# Patient Record
Sex: MALE | Race: WHITE
[De-identification: names, ages, dates, MRNs, and addresses within clinical notes are randomized per-mention and may not be internally consistent; named-entity substitution may affect disease eponyms.]

---

## 2017-07-03 NOTE — CT PRELIMINARY REPORT
Accession: [id number]

Exam: CT Thoracic Spine W/O

 

IMPRESSION: 

1. Acute T2 and T3 compression fractures involving the superior endplate anterior columns with mild c
ompression. Acute left posterior medial first rib fracture, mildly displaced. Acute left posterior me
dial second and third rib fractures extending into the costovertebral joints. Small left apex hemotho
rax associated with the first rib fracture. 

2. Couple of tiny right upper lobe pulmonary nodules measuring 4 mm. Recommend a one-year follow-up c
hest CT to confirm stability. Otherwise, as above.

 

[facility]

 

SITE ID: [id number]

## 2017-07-03 NOTE — ED PHYSICIAN DOCUMENTATION
PD HPI HEAD INJURY





- Stated complaint


Stated Complaint: HEAD INJ





- Chief complaint


Chief Complaint: Laceration





- History obtained from


History obtained from: Patient





- History of Present Illness


Mechanism of head injury: Fell (He was playing tennis today, his hamstring 

locked up on him which has been an ongoing issue, and he fell hitting his 

occiput on a chain link fence with a large laceration there and complains of 

neck and upper back pain.  No other injuries.  Tetanus is up-to-date.  No loss 

of consciousness.  No anticoagulants.)





Review of Systems


Ten Systems: 10 systems reviewed and negative


Constitutional: denies: Fever, Chills


Cardiac: denies: Chest pain / pressure, Palpitations


Respiratory: denies: Dyspnea, Cough


GI: denies: Abdominal Pain





PD PAST MEDICAL HISTORY





- Present Medications


Home Medications: 


 Ambulatory Orders











 Medication  Instructions  Recorded  Confirmed


 


Simvastatin 10 mg PO DAILY 07/03/17 07/03/17


 


raNITIdine [Zantac] 150 mg PO DAILY 07/03/17 07/03/17


 


traZODone [Desyrel] 150 mg PO DAILY 07/03/17 07/03/17














- Allergies


Allergies/Adverse Reactions: 


 Allergies











Allergy/AdvReac Type Severity Reaction Status Date / Time


 


No Known Drug Allergies Allergy   Verified 07/03/17 17:44














PD ED PE NORMAL





- Vitals


Vital signs reviewed: Yes





- General


General: Alert and oriented X 3, No acute distress





- HEENT


HEENT: PERRL, EOMI, Other (Large occipital laceration, incompletely evaluated 

on initial evaluation because of C-spine precautions)





- Neck


Neck: No bony TTP





- Cardiac


Cardiac: RRR, No murmur





- Respiratory


Respiratory: No respiratory distress, Clear bilaterally





- Abdomen


Abdomen: Soft, Non tender





- Back


Back: No CVA TTP, Other (Some upper T-spine tenderness)





- Derm


Derm: Normal color, Warm and dry





- Extremities


Extremities: Other (Tenderness of the left hamstring but good range of motion, 

NVI)





- Neuro


Neuro: Alert and oriented X 3, Normal speech





- Psych


Psych: Normal mood, Normal affect





Results





- Vitals


Vitals: 


 Vital Signs - 24 hr











  07/03/17 07/03/17





  17:37 20:23


 


Temperature 36.6 C 


 


Heart Rate 64 67


 


Respiratory 20 18





Rate  


 


Blood Pressure 154/81 H 126/73


 


O2 Saturation 98 96








 Oxygen











O2 Source                      Room air

















- Labs


Labs: 


 Laboratory Tests











  07/03/17 07/03/17 07/03/17





  20:20 20:20 20:20


 


WBC  13.8 H  


 


RBC  4.73  


 


Hgb  13.7 L  


 


Hct  40.4 L  


 


MCV  85.4  


 


MCH  29.0  


 


MCHC  34.0  


 


RDW  13.2  


 


Plt Count  177  


 


MPV  8.5  


 


Neut #  11.1 H  


 


Lymph #  1.3 L  


 


Mono #  1.4 H  


 


Eos #  0.0  


 


Baso #  0.0  


 


Absolute Nucleated RBC  0.00  


 


Nucleated RBCs  0.0  


 


PT   11.4 


 


INR   1.0 


 


Sodium    142


 


Potassium    3.7


 


Chloride    111


 


Carbon Dioxide    23


 


Anion Gap    8.0


 


BUN    22 H


 


Creatinine    1.0


 


Estimated GFR (MDRD)    74 L


 


Glucose    111 H


 


Calcium    9.1


 


Total Bilirubin    0.8


 


AST    33


 


ALT    35


 


Alkaline Phosphatase    134 H


 


Total Protein    7.0


 


Albumin    4.2


 


Globulin    2.8


 


Albumin/Globulin Ratio    1.5


 


Lipase    26














- Rads (name of study)


  ** CT head, C-spine, thoracic Spine


Radiology: EMP read contemporaneously (Head shows chronic white matter changes, 

no intracranial hemorrhage.  Cervical spine was negative.  He does have acute 

T2 and T3 compression fractures and an acute left posterior medial rib fracture 

with mild displacement, some pulmonary nodules and follow-up was advised.  He 

does have also left posterior medial second and third rib fractures and us 

small left apical hemothorax.)





Procedures





- Laceration (location)


  ** Occipital scalp


Length in cm: 12


Wound type: Stellate, Irregular, Flap, Into subcut fat


Anesthesia: Lidocaine 1%, With bicarb


Wound Preparation: Irrigated copiously NS


Skin layer closure: Staples


Other: Tetanus UTD


Complexity: Simple





PD MEDICAL DECISION MAKING





- ED course


ED course: 





71-year-old gentleman after ground-level fall with multiple injuries including 

to thoracic compression fractures, multiple rib fractures, and a large 

occipital scalp laceration.  Initially he very much wanted to go home, but was 

unable to ambulate and it was difficult to control his pain.


Spoke with Dr. Eboni Pyle, she is the general surgeon but we both agree there is 

no general surgical issue, but since its trauma she is available if needed.


Spoke with Dr. Plasencia for observation at 8:30 PM





Departure





- Departure


Disposition: ED Place in Observation


Clinical Impression: 


Compression fx, thoracic spine


Qualifiers:


 Encounter type: initial encounter Fracture type: closed Qualified Code(s): 

S22.000A - Wedge compression fracture of unspecified thoracic vertebra, initial 

encounter for closed fracture





Multiple rib fractures


Qualifiers:


 Encounter type: initial encounter Fracture type: closed Laterality: bilateral 

Qualified Code(s): S22.43XA - Multiple fractures of ribs, bilateral, initial 

encounter for closed fracture





Occipital scalp laceration


Qualifiers:


 Encounter type: initial encounter Qualified Code(s): S01.01XA - Laceration 

without foreign body of scalp, initial encounter





Head injury


Qualifiers:


 Encounter type: initial encounter Qualified Code(s): S09.90XA - Unspecified 

injury of head, initial encounter


Condition: Good


Record reviewed to determine appropriate education?: Yes


Discharge Date/Time: 07/03/17 21:39

## 2017-07-03 NOTE — CT REPORT
EXAM:

CT HEAD

 

EXAM DATE: 7/3/2017 06:05 PM.

 

CLINICAL HISTORY: Head inj.

 

COMPARISON: None.

 

TECHNIQUE: Multiaxial CT images were obtained from the foramen magnum to the vertex. IV contrast: Non
e. Reformats: Coronal.

 

In accordance with CT protocol optimization, one or more of the following dose reduction techniques w
ere utilized for this exam: automated exposure control, adjustment of mA and/or KV based on patient s
ize, or use of iterative reconstructive technique.

 

FINDINGS:

Parenchyma: No definite acute parenchymal hemorrhage, mass, or midline shift. There is mild bilateral
 areas of white matter hepatic attenuation seen that are age-indeterminate but appear chronic.

 

Extraaxial Spaces: Normal for age. No subdural or epidural collections identified.

 

Ventricles: Normal in size and position.

 

Sinuses: Imaged paranasal sinuses, orbits, and mastoids show no significant abnormality.

 

Bones: No evidence of fracture or calvarial defect.

 

Other: Atherosclerotic calcifications cavernous ICA segment. Left parietal scalp contusion laceration
 with no extension to the calvarium

 

IMPRESSION: 

 

1. No definite acute infarct, intracranial hemorrhage, mass, or hydrocephalus.

 

2. Mild white matter changes that are age-indeterminate but appear chronic. Findings may represent se
quela of chronic small vessel ischemic disease. If there is clinical concern for acute stroke or clin
ical symptoms persist an MR brain without contrast can be considered to evaluate for smaller subtle i
nfarct.

 

3. Small to moderate left parietal scalp contusion and laceration with no extension to the calvarium.
 No calvarial fracture seen.

 

[facility]

Referring Provider Line: [phone]

 

SITE ID: [id number]

## 2017-07-03 NOTE — CT PRELIMINARY REPORT
Accession: [id number]

Exam: CT Cervical Spine W/O

 

IMPRESSION: 

1. No evidence for acute fracture in the cervical spine. 

2. Mild to moderate degenerative changes. 

3. See the abnormal findings in the CT thoracic spine report

 

[facility]

 

SITE ID: [id number]

## 2017-07-03 NOTE — CT REPORT
EXAM:

CT THORACIC SPINE WITHOUT CONTRAST

 

EXAM DATE: 7/3/2017 06:18 PM.

 

CLINICAL HISTORY: Back pain after fall

 

COMPARISONS: None.

 

TECHNIQUE: Thin-section axial images were acquired of the thoracic spine from C7 to L1 without contra
st. Post-processing: Coronal and sagittal reformats. Other: None.

 

In accordance with CT protocol optimization, one or more of the following dose reduction techniques w
ere utilized for this exam: automated exposure control, adjustment of mA and/or KV based on patient s
ize, or use of iterative reconstructive technique.

 

FINDINGS: 

Alignment: No spondylolisthesis.

 

Bones: Acute T2 and T3 compression fractures involving the superior endplate anterior columns with mi
ld compression. Acute left posterior medial first rib fracture, mildly displaced. Acute left posterio
r medial second and third rib fractures extending into the costovertebral joints. Small left apex hem
othorax associated with the first rib fracture.

 

Minimal biapical paraseptal emphysema, right greater than left.

 

 

Mild to moderate degenerative disk disease more moderate at the mid and lower levels. Moderate to lar
ge osteophytes at the mid and lower thoracic spine.

 

4 mm right apex pulmonary nodule. More inferior to this is another pulmonary nodule measuring 4 mm in
 the right upper lobe. 

 

IMPRESSION: 

1. Acute T2 and T3 compression fractures involving the superior endplate anterior columns with mild c
ompression. Acute left posterior medial first rib fracture, mildly displaced. Acute left posterior me
dial second and third rib fractures extending into the costovertebral joints. Small left apex hemotho
rax associated with the first rib fracture. 

2. Couple of tiny right upper lobe pulmonary nodules measuring 4 mm. Recommend a one-year follow-up c
hest CT to confirm stability. Otherwise, as above.

 

[facility]

Referring Provider Line: [phone]

 

SITE ID: [id number]

## 2017-07-03 NOTE — CT PRELIMINARY REPORT
Accession: [id number]

Exam: CT Head W/O

 

IMPRESSION: 

 

1. No definite acute infarct, intracranial hemorrhage, mass, or hydrocephalus.

 

2. Mild white matter changes that are age-indeterminate but appear chronic. Findings may represent se
quela of chronic small vessel ischemic disease. If there is clinical concern for acute stroke or clin
ical symptoms persist an MR brain without contrast can be considered to evaluate for smaller subtle i
nfarct.

 

3. Small to moderate left parietal scalp contusion and laceration with no extension to the calvarium.
 No calvarial fracture seen.

 

[facility]

 

SITE ID: [id number]

## 2017-07-03 NOTE — CT REPORT
EXAM:

CT CERVICAL SPINE WITHOUT CONTRAST

 

DATE: 7/3/2017 06:09 PM

 

HISTORY: Neck pain after fall.

 

COMPARISONS: None.

 

TECHNIQUE: Thin-section axial images were acquired of the cervical spine without contrast. Post-proce
ssing: Coronal and sagittal reformats. Other: None.

 

In accordance with CT protocol optimization, one or more of the following dose reduction techniques w
ere utilized for this exam: automated exposure control, adjustment of mA and/or KV based on patient s
ize, or use of iterative reconstructive technique.

 

FINDINGS: 

Alignment: No spondylolisthesis. Mild leftward curve in the cervical spine.

 

Bones: No evidence for acute fracture in the cervical spine. Mild T7 anterior compression deformity a
ppears chronic.

 

Interspace Levels/Facets: Mild to moderate degenerative disk disease, most severe at C5-C6 with moder
ate disk height loss and osteophytes. Mild to moderate diffuse bilateral facet arthropathy.

 

No acute soft tissue findings

 

IMPRESSION: 

1. No evidence for acute fracture in the cervical spine. 

2. Mild to moderate degenerative changes. 

3. See the abnormal findings in the CT thoracic spine report

 

[facility]

Referring Provider Line: [phone]

 

SITE ID: [id number]

## 2017-07-04 NOTE — XRAY REPORT
EXAM:

CHEST RADIOGRAPHY

 

EXAM DATE: 7/4/2017 10:29 AM.

 

CLINICAL HISTORY: Hemothorax.

 

COMPARISON: None.

 

TECHNIQUE: 2 views.

 

FINDINGS: 

Lungs/Pleura: The lungs are grossly clear. No focal opacities evident. No pleural effusion. No pneumo
thorax. Normal volumes.

 

Mediastinum: Heart and mediastinal contours are accentuated by the lordotic projection.

 

Other: Multilevel degenerative changes in the thoracic spine.

 

IMPRESSION: Clear lungs. 

 

[facility]

Referring Provider Line: [phone]

 

SITE ID: [id number]

## 2017-07-04 NOTE — HISTORY & PHYSICAL EXAMINATION
Chief Complaint





- Chief Complaint


Chief Complaint: fall, bleeding from back of head and neck pain





History of Present Illness





- Admitted From


Admitted From:: emergency department





- History Obtained From


Records Reviewed: yes


History obtained from: patient


Exam Limitations: none





- History of Present Illness


HPI Comment/Other: 





The patient is a very pleasant 71-year-old gentleman with a past medical 

history significant for hyperlipidemia and PTSD who presented to the emergency 

department with a chief complaint of a fall while playing tennis from which she 

now has a bleeding scalp and neck pain. The patient states that he was playing 

tennis and was running after her shot when he ran passed the ball and his 

momentum carried him into a chain length fence into which the fluid headfirst 

hit his head head on the fence and then fell down chest first on a hard 

surface. The patient states the moment after the fall are kind of a blur. He 

states he did not lose consciousness. But he noted that he had blood gushing 

from his scalp. He also stated that he was in severe pain especially in his 

neck upper back and chest. He states that while he was chasing down the ball he 

felt his left hamstring get out. He is also having pain in the left hamstring. 

The patient denies having had any fevers or chills, he denies any runny nose, 

sore throat, cough, shortness of air, orthopnea, PND, increased lower extremity 

swelling, diarrhea, constipation, abdominal pain, nausea, vomiting, or any 

focal neurologic deficits. The patient does state he has a headache and he 

states that he has severe pain when he takes a deep breath. He also states that 

he has pain in the back of his head and neck upper back and his left hamstring. 

He states the pain is still an 8-9/10.





On presentation to the emergency department the patient was afebrile his blood 

pressure was mildly elevated but he was not in any acute respiratory distress. 

The patient was brought into the emergency department on a backboard with c-

collar. He was bleeding from the back of his head and appeared to be in quite a 

bit of distress secondary to pain. The patient underwent imaging of his 

cervical spine head and thoracic spine. CT of the cervical spine revealed no 

evidence for acute fracture in the cervical spine. CT of the thoracic spine 

revealed an acute T2 and T3 compression fracture involving the superior 

endplate anterior columns with mild compression. There was also finding of 

acute left posterior medial first rib fracture which is mildly displaced. There 

was also finding of acute left posterior medial second and third rib fractures 

extending into the costal vertebral joints. Finally there was a finding of a 

small left apex pneumothorax associated with the first rib fracture. A couple 

of tiny right upper lobe pulmonary nodules are also seen measuring 4 mm. CT of 

his head did not show any intracranial hemorrhage or hydrocephalus. There was a 

small to moderate left parietal scalp contusion and laceration with no 

extension to the calvarium. Despite several doses of pain medication the 

emergency department the patient's pain was not controlled and the patient was 

unable to get up out of bed secondary to all of his fractures and pain 

therefore he was placed in observation for pain control.





Review of Systems





- Constitutional


Constitutional: denies: Fatigue, Fever, Chills, Malaise, Weakness, Poor appetite

, Diaphoresis, Night sweats, Weight gain, Weight loss





- Eyes


Eyes: denies: Pain, Irritation, Amaurosis, Blurred vision, Field loss, Vision 

loss, Dipolpia





- Ears, Nose & Throat


Ears, Nose & Throat: reports: Nasal congestion.  denies: Ear pain, Hearing loss

, Hearing aids, Tinnitus, Vertigo, Nasal pain, Nasal discharge, Nosebleeds, 

Nasal obstruction, Postnasal drainage, Dentures, Sore throat, Hoarseness, Mouth 

lesions, Bleeding gums, Dental pain





- Cardiovascular


Cariovascular: denies: Irregular heart rate, Palpitations, Chest pain, Edema, 

Lightheadedness, Syncope, Exertional dyspnea, Decr. exercise tolerance, 

Orthopnea





- Respiratory


Respiratory: reports: Pleuritic pain.  denies: Cough, Sputum production, 

Wheezing, Snoring, Hemoptysis, Orthopnea, SOB at rest, SOB with exertion, Apnea

, Stridor





- Gastrointestinal


Gastrointestinal: denies: Abdominal pain, Abdominal distention, Constipation, 

Diarrhea, Change in bowel habits, Rectal bleeding, Black stools, Bloody stools, 

Nausea, Vomiting, Bile emesis, Jorge blood emesis, Coffee grounds emesis, Reflux

/heartburn, Bloating, Poor appetite





- Genitourinary


Genitourinary: denies: Dysuria, Frequency, Urgency, Hematuria, Flank pain





- Musculoskeletal


Musculoskeletal: reports: Muscle pain (left hamstring), Back pain (neck and 

upper back), Stiffness, Other (Neck pain)





- Integumentary


Integumentary: denies: Rash, Pruritis, Lesions, Dryness, Nail changes





- Neurological


Neurological: reports: Headache.  denies: General weakness, Focal weakness, 

Dizziness, Numbness, Abnormal gait, Seizures, Slurred speech





- Psychiatric


Psychiatric: denies: Depression, Anxiety





- Endocrine


Endocrine: denies: Polyuria, Polydypsia, Polyphagia, Intolerance to cold, 

Intolerance to heat





History





- Past Medical History


Cardiovascular: reports: High cholesterol


Respiratory: reports: None


Neuro: reports: None


Endocrine/Autoimmune: reports: None


GI: reports: None


GYN: reports: None


: reports: None


HEENT: reports: Chronic sinusitis


Psych: reports: Post traumatic stress disorder


Musculoskeletal: reports: None


Derm: reports: None


MRSA Hx?: No


Other Past Medical History: 1. Hyperlipidemia.  2.  PTSD





- Past Surgical History


Ortho: reports: Rotator cuff repair, Other





- Family & Social History


Family History: Mother:  (Mom lived to  and  of old age), Father: 

, Cancer (Dad: Prostate Ca, Maternal GF: Lung Ca), Renal Disease/Failure

, Other family: Cancer


Family History Comment/Other: 





Patient's father had prostate cancer and kidney failure. Patient's mother lived 

to be 95 and  of old age. His mother was otherwise healthy. Patient's 

maternal grandfather had lung cancer and  in his 30s.


Living arrangement: At home


Living Situation: With spouse/s.o.


Social History Notes: The patient was born in [facility]. He joined 

the [employer] and moved throughout the country he spent a lot of time in [place] which is where he met his wife. They now live and [place] and have 

2 daughters who are living in [place]. The patient is retired. He is very 

active as he plays pickle ball and tennis. The patient was previously a smoker 

he quit 10 years ago he smoked one pack per day for 40 years. The patient 

drinks 2-3 Ronak Alvares drinks a night. Patient does use recreational marijuana.





- Substance History


Use: Uses substance without health or social issues: Alcohol, Cannabis


Abuse: Recurrent use of substance despite neg consequences: NONE


Dependence: Experiences withdrawal or developed tolerances: NONE





- POLST


Patient has POLST: No


POLST Status: Full Code





Meds/Allgy





- Home Medications


Home Medications: 


 Ambulatory Orders











 Medication  Instructions  Recorded  Confirmed


 


Simvastatin 10 mg PO DAILY 17


 


raNITIdine [Zantac] 150 mg PO DAILY 17


 


traZODone [Desyrel] 150 mg PO DAILY 17














- Allergies


Allergies/Adverse Reactions: 


 Allergies











Allergy/AdvReac Type Severity Reaction Status Date / Time


 


No Known Drug Allergies Allergy   Verified 17 17:44














Exam





- Vital Signs


Reviewed Vital Signs: Yes


Vital Signs: 





 Vital Signs x48h











  Temp Pulse Resp BP Pulse Ox


 


 17 22:00  36.6 C  94  18  150/95 H  94














- Physical Exam


General Appearance: positive: Alert, Moderate distress (Severe pain in the back 

and neck with any movements. Headache, patient is miserable.)


Eyes Bilateral: positive: Normal inspection, PERRL, EOMI, No lid inflammation, 

Conjunctivae nml, No scleral icterus


ENT: positive: ENT inspection nml, Pharynx nml, No signs of dehydration.  

negative: Purulent nasal drainage, Pharyngeal erythema, Oral lesions


Neck: positive: Nml inspection, Thyroid nml, No JVD, Trachea midline, Stiff 

neck.  negative: Thyromegaly, Lymphadenopathy (R), Lymphadenopathy (L), 

Tracheal deviation


Respiratory: positive: Chest non-tender, No respiratory distress, Other (Pain 

with deep breathing, mild crackles at the bases.)


Cardiovascular: positive: Regular rate & rhythm, No murmur, No gallop


Peripheral Pulses: positive: 2+


Abdomen: positive: Non-tender, No organomegaly, Nml bowel sounds, No 

distention.  negative: Guarding, Rebound, Hepatomegaly


Back: positive: Other (Paraspinal muscles with spasm, tenderness to palpation 

of the thoracic spine.).  negative: CVA tenderness (R), CVA tenderness (L)


Skin: positive: Warm, Laceration (cm) (back of head with staples. Large 

occiptal scalp lacertaion now closed up.).  negative: Cyanosis, Pallor


Extremities: positive: Full ROM, Nml appearance, No pedal edema, Other (Left 

hamstring very painful with ROM).  negative: Joint swelling


Neurologic/Psychiatric: positive: Oriented x3, CN's nml (2-12), Motor nml, 

Sensation nml, Mood/affect nml





Conclusion/Plan





- Problem List


(1) Compression fx, thoracic spine


Conclusion/Plan: 


Acute T2 and T3 compression fractures secondary to trauma


Stable do not require brace or surgery


Patient in severe pain which is uncontrolled despite IV pain medications


Patient unable to move out of the bed 


Secondary to trauma while falling into fence playing tennis





Plan:


Pain control with IV pain meds


PT evaluation 


Qualifiers: 


   Encounter type: initial encounter   Fracture type: closed   Qualified Code(s)

: S22.000A - Wedge compression fracture of unspecified thoracic vertebra, 

initial encounter for closed fracture   





(2) Multiple rib fractures


Conclusion/Plan: 


Acute left posterior medial 1st rib fx with mild displacement, acute left 

posterior medial second and third rib fx extending to the costovertebral 

joints. 


Secondary to trauma from fall


Pain uncontrolled





Plan:


IV pain control with dilaudid 


Transition to PO meds once able to tolerate pain better


IS to prevent pneumonia





Qualifiers: 


   Encounter type: initial encounter   Fracture type: closed   Laterality: 

bilateral   Qualified Code(s): S22.43XA - Multiple fractures of ribs, bilateral

, initial encounter for closed fracture   





(3) Occipital scalp laceration


Conclusion/Plan: 


Secondary to trauma s/p staples in the ER with closing of laceration and 

stopping of bleeding


Patient has headache but stable


Will need to remove staples as outpatient 


Qualifiers: 


   Encounter type: initial encounter   Qualified Code(s): S01.01XA - Laceration 

without foreign body of scalp, initial encounter   





(4) Hemothorax on left


Conclusion/Plan: 


Small left apex hemothorax seen on CT secondary to trauma from fall





Plan:


Monitor respiratory status overnight


CXR in the am to evaluate if it has gotten any worse








(5) Pulmonary nodule, left


Conclusion/Plan: 


Patient had found to have incidental right upper lobe pulmonary nodules 

measuring 4mm


Follow up CT recommended in 1 year 








(6) Hyperlipidemia


Conclusion/Plan: 


Continue statin


Stable








(7) PTSD (post-traumatic stress disorder)


Conclusion/Plan: 


Patient takes trazadone at night


Will get ambien while hospitalized 








(8) Prophylactic use of low molecular weight heparin for venous thromboembolism


Conclusion/Plan: 


Placed on lovenox while hospitalized 








- Lab Results


Lab results reviewed: Yes


Fish Bones: 


 17 20:20





 17 20:20


Other Lab Results: 








 Laboratory Results











WBC  13.8 x10^3/uL (4.8-10.8)  H  17  20:20    


 


RBC  4.73 10^6/uL (4.70-6.10)   17  20:20    


 


Hgb  13.7 g/dL (14.0-18.0)  L  17  20:20    


 


Hct  40.4 % (42.0-52.0)  L  17  20:20    


 


MCV  85.4 fL (80.0-94.0)   17  20:20    


 


MCH  29.0 pg (27.0-31.0)   17  20:20    


 


MCHC  34.0 g/dL (32.0-36.0)   17  20:20    


 


RDW  13.2 % (12.0-15.0)   17  20:20    


 


Plt Count  177 10^3/uL (130-450)   17  20:20    


 


MPV  8.5 fL (7.4-11.4)   17  20:20    


 


Neut #  11.1 10^3/uL (1.5-6.6)  H  17  20:20    


 


Lymph #  1.3 10^3/uL (1.5-3.5)  L  17  20:20    


 


Mono #  1.4 10^3/uL (0.0-1.0)  H  17  20:20    


 


Eos #  0.0 10^3/uL (0.0-0.7)   17  20:20    


 


Baso #  0.0 10^3/uL (0.0-0.1)   17  20:20    


 


Absolute Nucleated RBC  0.00 x10^3/uL  17  20:20    


 


Nucleated RBCs  0.0 /100WBC  17  20:20    


 


PT  11.4 secs (9.9-12.6)   17  20:20    


 


INR  1.0  (0.8-1.2)   17  20:20    


 


Sodium  142 mmol/L (135-145)   17  20:20    


 


Potassium  3.7 mmol/L (3.5-5.0)   17  20:20    


 


Chloride  111 mmol/L (101-111)   17  20:20    


 


Carbon Dioxide  23 mmol/L (21-32)   17  20:20    


 


Anion Gap  8.0  (6-13)   17  20:20    


 


BUN  22 mg/dL (6-20)  H  17  20:20    


 


Creatinine  1.0 mg/dL (0.6-1.2)   17  20:20    


 


Estimated GFR (MDRD)  74  (>89)  L  17  20:20    


 


Glucose  111 mg/dL ()  H  17  20:20    


 


Calcium  9.1 mg/dL (8.5-10.3)   17  20:20    


 


Total Bilirubin  0.8 mg/dL (0.2-1.0)   17  20:20    


 


AST  33 IU/L (10-42)   17  20:20    


 


ALT  35 IU/L (10-60)   17  20:20    


 


Alkaline Phosphatase  134 IU/L ()  H  17  20:20    


 


Total Protein  7.0 g/dL (6.7-8.2)   17  20:20    


 


Albumin  4.2 g/dL (3.2-5.5)   17  20:20    


 


Globulin  2.8 g/dL (2.1-4.2)   17  20:20    


 


Albumin/Globulin Ratio  1.5  (1.0-2.2)   17  20:20    


 


Lipase  26 U/L (22-51)   17  20:20    














- Diagnostic Imaging Results


Diagnostic Imaging Results: positive: Final report reviewed





Issues/Core Measures





- Anticipated LOS


Anticipated Stay Length: Less than 2 midnights





- DVT/VTE - Prophylaxis


VTE/DVT Prophylaxis med ordered at admit?: Yes

## 2017-07-04 NOTE — PROVIDER PROGRESS NOTE
Assessment/Plan





- Problem List


(1) Compression fx, thoracic spine


Qualifiers: 


   Encounter type: initial encounter   Fracture type: closed   Qualified Code(s)

: S22.000A - Wedge compression fracture of unspecified thoracic vertebra, 

initial encounter for closed fracture   


Assessment/Plan: 


Ancelmo is having a lot of pain from his multiple injuries. 


His t spine fratures increase his issues with cough and deep inspiration 

incrteasing his risk of 


pneumonia. Also contributes to his pain burden. 








(2) Hemothorax on left


Assessment/Plan: 


This a small apical lesion CXR is pending for the repeat to see if it has 

increased.


No decrease in breath sounds and 


no respiratory distress.








(3) Multiple rib fractures


Qualifiers: 


   Encounter type: initial encounter   Fracture type: closed   Laterality: 

bilateral   Qualified Code(s): S22.43XA - Multiple fractures of ribs, bilateral

, initial encounter for closed fracture   


Assessment/Plan: 


The three rib fractures increase his complication rate and will also be 

monitored and 


will mobilize the patient.








- Current Meds


Current Meds: 





 Current Medications











Generic Name Dose Route Start Last Admin





  Trade Name Freq  PRN Reason Stop Dose Admin


 


Acetaminophen  650 mg 07/03/17 20:35 07/04/17 08:13





  Tylenol  PO   650 mg





  Q4HR PRN   Administration





  Pain 1 to 4   


 


Bacitracin  1 packet 07/03/17 22:02 07/03/17 22:50





  Bacitracin  TOP   3 packet





  PRN PRN   Administration





  Skin Care   


 


Enoxaparin Sodium  40 mg 07/04/17 09:00 07/04/17 08:15





  Lovenox  SUBQ   40 mg





  DAILY LANDY   Administration


 


Hydromorphone HCl  1 mg 07/03/17 20:35 07/04/17 07:28





  Dilaudid Inj  IVP   1 mg





  Q2HR PRN   Administration





  Pain 8 to 10   


 


Ibuprofen  600 mg 07/03/17 20:35 07/04/17 08:12





  Motrin  PO   600 mg





  Q6HR PRN   Administration





  Pain 1 to 4   


 


Ondansetron HCl  4 mg 07/03/17 20:35 07/04/17 07:33





  Zofran Inj  IVP   4 mg





  Q6HR PRN   Administration





  Nausea / Vomiting   


 


Oxycodone HCl  5 mg 07/03/17 20:35 07/04/17 06:01





  Roxicodone  PO   5 mg





  Q4HR PRN   Administration





  Pain 5 to 7   


 


Oxycodone HCl  10 mg 07/03/17 20:35 07/04/17 08:13





  Roxicodone  PO   10 mg





  Q4HR PRN   Administration





  Pain 8 to 10   


 


Pantoprazole Sodium  40 mg 07/04/17 07:00 07/04/17 06:01





  Protonix  PO   40 mg





  QDAC LANDY   Administration


 


Polyethylene Glycol  17 gm 07/04/17 09:00 07/04/17 08:14





  Miralax  PO   17 gm





  DAILY LANDY   Administration


 


Sodium Chloride  10 ml 07/03/17 22:00 07/04/17 07:29





  Normal Saline Flush 0.9%  IVP   40 ml





  Q8HR LANDY   Administration


 


Zolpidem Tartrate  5 mg 07/03/17 20:35 07/04/17 00:20





  Ambien  PO   5 mg





  QPM PRN   Administration





  Insomnia   














- Lab Result


Fish Bone Diagrams: 


 07/04/17 07:52





 07/04/17 07:52





- Additional Planning


My Orders: 





My Active Orders





07/04/17


General Surgery Consult [CONS] Routine 





07/04/17 10:51


Admit \ Transfer \ Status [RC] ONCE 





07/04/17 21:00


Atorvastatin [Lipitor]   10 mg PO QPM 


traZODone [Desyrel]   150 mg PO QPM 





07/05/17 05:00


CBC - COMP BLD CT W/AUTO DIFF [HEME] DAILYLAB 


COMPREHENSIVE METABOLIC PANEL [CHEM] DAILYLAB 














Subjective





- Subjective


Patient Reports: Back Pain, Nausea, Pain


Nursing Reports: Pain





Objective


Vital Signs: 





 Vital Signs - 24 hr











  07/03/17 07/04/17 07/04/17





  22:00 00:31 05:32


 


Temperature 36.6 C 36.5 C 36.6 C


 


Heart Rate [ 94 66 63





Brachial]   


 


Respiratory 18 18 18





Rate   


 


Blood Pressure 150/95 H 137/87 H 142/80 H





[Right Brachial   





artery]   


 


O2 Saturation 94 96 96














  07/04/17





  07:49


 


Temperature 36.7 C


 


Heart Rate [ 67





Brachial] 


 


Respiratory 18





Rate 


 


Blood Pressure 125/77





[Right Brachial 





artery] 


 


O2 Saturation 95








 Oxygen











O2 Source                      Room air














I&O (Last 24 Hrs): 





 Intake and Output Totals x24h











 07/02/17 07/03/17 07/04/17





 23:59 23:59 23:59


 


Intake Total   1097


 


Output Total  400 500


 


Balance  -400 597











General: Alert, Oriented x3, Cooperative


HEENT: PERRLA, EOMI


Neck: No thyromegaly


Neuro: Alert, Oriented Times 3


Cardiovascular: Regular rate, No murmurs


Respiratory: Breath sounds nml


Abdomen: Soft, No tenderness


Extremities: No clubbing, No cyanosis, No edema





- Results


Results: 





 Laboratory Results











WBC  11.4 x10^3/uL (4.8-10.8)  H  07/04/17  07:52    


 


RBC  4.43 10^6/uL (4.70-6.10)  L  07/04/17  07:52    


 


Hgb  12.8 g/dL (14.0-18.0)  L  07/04/17  07:52    


 


Hct  37.7 % (42.0-52.0)  L  07/04/17  07:52    


 


MCV  85.0 fL (80.0-94.0)   07/04/17  07:52    


 


MCH  28.9 pg (27.0-31.0)   07/04/17  07:52    


 


MCHC  34.1 g/dL (32.0-36.0)   07/04/17  07:52    


 


RDW  13.5 % (12.0-15.0)   07/04/17  07:52    


 


Plt Count  162 10^3/uL (130-450)   07/04/17  07:52    


 


MPV  8.0 fL (7.4-11.4)   07/04/17  07:52    


 


Neut #  8.1 10^3/uL (1.5-6.6)  H  07/04/17  07:52    


 


Lymph #  1.6 10^3/uL (1.5-3.5)   07/04/17  07:52    


 


Mono #  1.6 10^3/uL (0.0-1.0)  H  07/04/17  07:52    


 


Eos #  0.0 10^3/uL (0.0-0.7)   07/04/17  07:52    


 


Baso #  0.0 10^3/uL (0.0-0.1)   07/04/17  07:52    


 


Absolute Nucleated RBC  0.01 x10^3/uL  07/04/17  07:52    


 


Nucleated RBCs  0.1 /100WBC  07/04/17  07:52    


 


PT  11.4 secs (9.9-12.6)   07/03/17  20:20    


 


INR  1.0  (0.8-1.2)   07/03/17  20:20    


 


Sodium  139 mmol/L (135-145)   07/04/17  07:52    


 


Potassium  3.9 mmol/L (3.5-5.0)   07/04/17  07:52    


 


Chloride  109 mmol/L (101-111)   07/04/17  07:52    


 


Carbon Dioxide  24 mmol/L (21-32)   07/04/17  07:52    


 


Anion Gap  6.0  (6-13)   07/04/17  07:52    


 


BUN  20 mg/dL (6-20)   07/04/17  07:52    


 


Creatinine  0.9 mg/dL (0.6-1.2)   07/04/17  07:52    


 


Estimated GFR (MDRD)  84  (>89)  L  07/04/17  07:52    


 


Glucose  127 mg/dL ()  H  07/04/17  07:52    


 


Calcium  8.5 mg/dL (8.5-10.3)   07/04/17  07:52    


 


Total Bilirubin  0.8 mg/dL (0.2-1.0)   07/04/17  07:52    


 


AST  32 IU/L (10-42)   07/04/17  07:52    


 


ALT  32 IU/L (10-60)   07/04/17  07:52    


 


Alkaline Phosphatase  116 IU/L ()   07/04/17  07:52    


 


Total Protein  6.6 g/dL (6.7-8.2)  L  07/04/17  07:52    


 


Albumin  3.7 g/dL (3.2-5.5)   07/04/17  07:52    


 


Globulin  2.9 g/dL (2.1-4.2)   07/04/17  07:52    


 


Albumin/Globulin Ratio  1.3  (1.0-2.2)   07/04/17  07:52    


 


Lipase  26 U/L (22-51)   07/03/17  20:20

## 2017-07-04 NOTE — CONSULTATION NOTE
Surgery Consult





- Admit Date


Hospital Admission Date: 07/04/17





- Consult Date


Consult Date: 07/04/17


Requesting Provider: Dr. Ibanez





- Home Meds/Allergies


Home Medications: 


 Patient History











 Medication  Instructions  Recorded  Confirmed


 


Simvastatin 10 mg PO QPM 07/03/17 07/04/17


 


raNITIdine [Zantac] 150 mg PO DAILY 07/03/17 07/03/17


 


traZODone [Desyrel] 150 mg PO QPM 07/03/17 07/04/17











Allergies/Adverse Reactions: 


 Allergies











Allergy/AdvReac Type Severity Reaction Status Date / Time


 


No Known Drug Allergies Allergy   Verified 07/03/17 17:44














- Consultation Note


Consultation Note: 





70y/o man fell while playing tennis after hamstring "went out" hitting head on 

chain link fence and landing on his chest.  He sustained a scalp laceration; 

acute compression fractures to T2 and T3; L posterior 2nd and 3rd rib fractures 

and a small hemothorax (an old C7 compression fracture was also seen on CT).  

There was no loss of conciousness and he has been alert and oriented during 

admission.  He complains of significant pain especially with coughing.  I 

reinforced the importance of doing the breathing exercises and of deep 

breathing and coughing.  I also reinforced the importance of not staying in bed

, but getting up and moving around.


PMH: PTSD; hyperlipidemia


PSH: Rotator cuff repair


Soc Hx: quit smoking 10yrs ago; 2-3 drinks/night








 Vital Signs











  07/04/17 07/04/17 07/04/17





  05:32 07:49 12:01


 


Temperature 36.6 C 36.7 C 36.5 C


 


Heart Rate [ 63 67 61





Brachial]   


 


Respiratory 18 18 16





Rate   


 


Blood Pressure 142/80 H 125/77 104/59 L





[Right Brachial   





artery]   


 


O2 Saturation 96 95 94














PE:


Gen: Pleasant; cooperative; no acute distress


HEENT: scalp lac with staples intact, no active bleeding; EOMI


Neck: supple, no JVD


Lungs: CTA B; good lung expansion; L chest wall is tender


Abd: soft; benign


Ext: L hamstring area tender but not markedly swollen; normal ROM


Neuro: A&Ox3; no focal deficits





A/P: Truamatic injury from fall with T2 & T3 compression fx; L 2nd & 3rd rib fx

; small hemothorax


Agressive pulmonary toilet.  I've increased the ordered frequency of IS and 

added acapella flutter valve.  Out of bed as much as possible, PT is seeing him 

now and has brought him a walker.  May need ortho eval of hamstring injury 

which can be done as an outpt.  Should be instructed to continue IS and 

acapella at home when d/cd along with mobilization.  Can f/u with surgery for 

removal of staples from scalp lac.

## 2017-07-04 NOTE — XRAY PRELIMINARY REPORT
Accession: [id number]

Exam: XR Chest 2 View PA/LAT

 

IMPRESSION: Clear lungs. 

 

[facility]

 

SITE ID: [id number]

## 2017-07-05 NOTE — PROVIDER PROGRESS NOTE
Assessment/Plan





- Problem List


(1) Compression fx, thoracic spine


Qualifiers: 


   Encounter type: initial encounter   Fracture type: closed   Qualified Code(s)

: S22.000A - Wedge compression fracture of unspecified thoracic vertebra, 

initial encounter for closed fracture   


Assessment/Plan: 


Ancelmo is having a lot of pain all over but especially his back and  chest and L 

leg.


He slept some and the valium is helping some of the spasms.


Overall he needs more PT and movement to be able to get him discharged.








(2) Hemothorax on left


Assessment/Plan: 


No evidence of hemo thorax on CXR yesterday and no pneumionia.


He is using the IS and the pickle.








(3) Multiple rib fractures


Qualifiers: 


   Encounter type: initial encounter   Fracture type: closed   Laterality: 

bilateral   Qualified Code(s): S22.43XA - Multiple fractures of ribs, bilateral

, initial encounter for closed fracture   


Assessment/Plan: 


He has the pain noted above and it is affecting his mobility but not his 

ventilation.








- Current Meds


Current Meds: 





 Current Medications











Generic Name Dose Route Start Last Admin





  Trade Name Freq  PRN Reason Stop Dose Admin


 


Atorvastatin Calcium  10 mg 07/04/17 21:00 07/04/17 20:54





  Lipitor  PO   10 mg





  QPM LANDY   Administration


 


Bacitracin  1 packet 07/03/17 22:02 07/03/17 22:50





  Bacitracin  TOP   3 packet





  PRN PRN   Administration





  Skin Care   


 


Diazepam  5 mg 07/03/17 20:35 07/05/17 07:34





  Valium  PO   5 mg





  Q6H PRN   Administration





  Muscle spasms   


 


Enoxaparin Sodium  40 mg 07/04/17 09:00 07/04/17 08:15





  Lovenox  SUBQ   40 mg





  DAILY LANDY   Administration


 


Hydromorphone HCl  1 mg 07/03/17 20:35 07/04/17 07:28





  Dilaudid Inj  IVP   1 mg





  Q2HR PRN   Administration





  Pain 8 to 10   


 


Acetaminophen  100 mls @ 400 mls/hr 07/04/17 18:00 07/05/17 06:00





  Ofirmev  IV   400 mls/hr





  Q6H LANDY   Administration


 


Ibuprofen  600 mg 07/03/17 20:35 07/04/17 20:54





  Motrin  PO   600 mg





  Q6HR PRN   Administration





  Pain 1 to 4   


 


Magnesium Oxide  400 mg 07/04/17 21:00 07/04/17 20:54





  Mag Ox  PO   400 mg





  BID LANDY   Administration


 


Ondansetron HCl  4 mg 07/03/17 20:35 07/04/17 07:33





  Zofran Inj  IVP   4 mg





  Q6HR PRN   Administration





  Nausea / Vomiting   


 


Oxycodone HCl  5 mg 07/03/17 20:35 07/04/17 13:11





  Roxicodone  PO   5 mg





  Q4HR PRN   Administration





  Pain 5 to 7   


 


Oxycodone HCl  10 mg 07/03/17 20:35 07/05/17 06:08





  Roxicodone  PO   10 mg





  Q4HR PRN   Administration





  Pain 8 to 10   


 


Pantoprazole Sodium  40 mg 07/04/17 07:00 07/05/17 06:00





  Protonix  PO   40 mg





  QDAC LANDY   Administration


 


Polyethylene Glycol  17 gm 07/04/17 09:00 07/04/17 08:14





  Miralax  PO   17 gm





  DAILY LANDY   Administration


 


Sodium Chloride  10 ml 07/03/17 22:00 07/05/17 05:19





  Normal Saline Flush 0.9%  IVP   Not Given





  Q8HR LANDY   


 


Trazodone HCl  150 mg 07/04/17 21:00 07/04/17 20:54





  Desyrel  PO   150 mg





  QPM LANDY   Administration


 


Zolpidem Tartrate  5 mg 07/03/17 20:35 07/04/17 00:20





  Ambien  PO   5 mg





  QPM PRN   Administration





  Insomnia   














- Lab Result


Fish Bone Diagrams: 


 07/05/17 05:18





 07/05/17 05:18





- Additional Planning


My Orders: 





My Active Orders





07/04/17 18:00


Acetaminophen 1,000 mg/100 ml [Ofirmev] 100 ml IV Q6H 





07/04/17 21:00


Magnesium Oxide [Mag Ox]   400 mg PO BID 














Subjective





- Subjective


Patient Reports: Back Pain, Pain


Nursing Reports: Pain





Objective


Vital Signs: 





 Vital Signs - 24 hr











  07/04/17 07/04/17 07/04/17





  12:01 15:30 19:40


 


Temperature 36.5 C 36.7 C 36.6 C


 


Heart Rate [ 61 59 L 61





Brachial]   


 


Respiratory 16 18 18





Rate   


 


Blood Pressure   





[Left Brachial   





artery]   


 


Blood Pressure 104/59 L 124/72 120/75





[Right Brachial   





artery]   


 


O2 Saturation 94 98 98














  07/05/17 07/05/17 07/05/17





  00:02 05:15 07:52


 


Temperature 36.6 C 36.6 C 36.7 C


 


Heart Rate [ 60 59 L 57 L





Brachial]   


 


Respiratory 16 16 18





Rate   


 


Blood Pressure 113/66 137/78 H 148/84 H





[Left Brachial   





artery]   


 


Blood Pressure   





[Right Brachial   





artery]   


 


O2 Saturation 92 97 100








 Oxygen











O2 Source                      Room air














I&O (Last 24 Hrs): 





 Intake and Output Totals x24h











 07/03/17 07/04/17 07/05/17





 23:59 23:59 23:59


 


Intake Total  1170 100


 


Output Total  360 950


 


Balance  810 -850











General: Alert, Oriented x3, Cooperative


HEENT: PERRLA, EOMI


Neck: No JVD, No thyromegaly


Neuro: Alert, Oriented Times 3


Cardiovascular: Regular rate, No murmurs


Respiratory: Chest non-tender, No respiratory distress, Breath sounds nml


Abdomen: Normal bowel sounds, Soft, No tenderness


Extremities: No clubbing, No cyanosis, No edema, Other (He has tenderness at he 

orgin of the biceps femoralis.)





- Results


Results: 





 Laboratory Results











WBC  9.6 x10^3/uL (4.8-10.8)   07/05/17  05:18    


 


RBC  4.35 10^6/uL (4.70-6.10)  L  07/05/17  05:18    


 


Hgb  12.8 g/dL (14.0-18.0)  L  07/05/17  05:18    


 


Hct  37.6 % (42.0-52.0)  L  07/05/17  05:18    


 


MCV  86.6 fL (80.0-94.0)   07/05/17  05:18    


 


MCH  29.3 pg (27.0-31.0)   07/05/17  05:18    


 


MCHC  33.9 g/dL (32.0-36.0)   07/05/17  05:18    


 


RDW  13.7 % (12.0-15.0)   07/05/17  05:18    


 


Plt Count  150 10^3/uL (130-450)   07/05/17  05:18    


 


MPV  8.5 fL (7.4-11.4)   07/05/17  05:18    


 


Neut #  5.8 10^3/uL (1.5-6.6)   07/05/17  05:18    


 


Lymph #  1.9 10^3/uL (1.5-3.5)   07/05/17  05:18    


 


Mono #  1.5 10^3/uL (0.0-1.0)  H  07/05/17  05:18    


 


Eos #  0.3 10^3/uL (0.0-0.7)   07/05/17  05:18    


 


Baso #  0.0 10^3/uL (0.0-0.1)   07/05/17  05:18    


 


Absolute Nucleated RBC  0.00 x10^3/uL  07/05/17  05:18    


 


Nucleated RBCs  0.0 /100WBC  07/05/17  05:18    


 


PT  11.4 secs (9.9-12.6)   07/03/17  20:20    


 


INR  1.0  (0.8-1.2)   07/03/17  20:20    


 


Sodium  140 mmol/L (135-145)   07/05/17  05:18    


 


Potassium  3.5 mmol/L (3.5-5.0)   07/05/17  05:18    


 


Chloride  108 mmol/L (101-111)   07/05/17  05:18    


 


Carbon Dioxide  27 mmol/L (21-32)   07/05/17  05:18    


 


Anion Gap  5.0  (6-13)  L  07/05/17  05:18    


 


BUN  21 mg/dL (6-20)  H  07/05/17  05:18    


 


Creatinine  1.0 mg/dL (0.6-1.2)   07/05/17  05:18    


 


Estimated GFR (MDRD)  74  (>89)  L  07/05/17  05:18    


 


Glucose  127 mg/dL ()  H  07/05/17  05:18    


 


Calcium  8.3 mg/dL (8.5-10.3)  L  07/05/17  05:18    


 


Total Bilirubin  0.6 mg/dL (0.2-1.0)   07/05/17  05:18    


 


AST  26 IU/L (10-42)   07/05/17  05:18    


 


ALT  25 IU/L (10-60)   07/05/17  05:18    


 


Alkaline Phosphatase  109 IU/L ()   07/05/17  05:18    


 


Total Protein  6.0 g/dL (6.7-8.2)  L  07/05/17  05:18    


 


Albumin  3.3 g/dL (3.2-5.5)   07/05/17  05:18    


 


Globulin  2.7 g/dL (2.1-4.2)   07/05/17  05:18    


 


Albumin/Globulin Ratio  1.2  (1.0-2.2)   07/05/17  05:18    


 


Lipase  26 U/L (22-51)   07/03/17  20:20

## 2017-07-06 NOTE — PROVIDER PROGRESS NOTE
Assessment/Plan





- Problem List


(1) Compression fx, thoracic spine


Qualifiers: 


   Encounter type: initial encounter   Fracture type: closed   Qualified Code(s)

: S22.000A - Wedge compression fracture of unspecified thoracic vertebra, 

initial encounter for closed fracture   


Assessment/Plan: 


He has not had any neurological Sx.


He has a lot of plain








(2) Hemothorax on left


Assessment/Plan: 


This has resolved.








(3) Multiple rib fractures


Qualifiers: 


   Encounter type: initial encounter   Fracture type: closed   Laterality: 

bilateral   Qualified Code(s): S22.43XA - Multiple fractures of ribs, bilateral

, initial encounter for closed fracture   


Assessment/Plan: 


He has a lot of pain with the three rib fractures.


He is using the IS and the pickle


He has clear lungs at this point.








- Current Meds


Current Meds: 





 Current Medications











Generic Name Dose Route Start Last Admin





  Trade Name Freq  PRN Reason Stop Dose Admin


 


Atorvastatin Calcium  10 mg 07/04/17 21:00 07/05/17 21:09





  Lipitor  PO   10 mg





  QPM LANDY   Administration


 


Bacitracin  1 packet 07/03/17 22:02 07/03/17 22:50





  Bacitracin  TOP   3 packet





  PRN PRN   Administration





  Skin Care   


 


Diazepam  5 mg 07/03/17 20:35 07/06/17 14:11





  Valium  PO   5 mg





  Q6H PRN   Administration





  Muscle spasms   


 


Docusate Sodium  250 - 500 mg 07/06/17 09:00 07/06/17 08:52





  Colace 250mg Capsule  PO   250 mg





  DAILY LANDY   Administration


 


Enoxaparin Sodium  40 mg 07/04/17 09:00 07/06/17 08:52





  Lovenox  SUBQ   40 mg





  DAILY LANDY   Administration


 


Acetaminophen  100 mls @ 400 mls/hr 07/04/17 18:00 07/06/17 12:48





  Ofirmev  IV   400 mls/hr





  Q6H LANDY   Administration


 


Ibuprofen  600 mg 07/03/17 20:35 07/06/17 16:36





  Motrin  PO   600 mg





  Q6HR PRN   Administration





  Pain 1 to 4   


 


Lidocaine  1 patch 07/05/17 10:50 07/06/17 08:53





  Lidoderm Patch  TOP   1 patch





  DAILY PRN   Administration





  PAIN   


 


Magnesium Oxide  400 mg 07/04/17 21:00 07/06/17 08:52





  Mag Ox  PO   400 mg





  BID LANDY   Administration


 


Ondansetron HCl  4 mg 07/03/17 20:35 07/04/17 07:33





  Zofran Inj  IVP   4 mg





  Q6HR PRN   Administration





  Nausea / Vomiting   


 


Oxycodone HCl  5 mg 07/03/17 20:35 07/05/17 22:54





  Roxicodone  PO   5 mg





  Q4HR PRN   Administration





  Pain 5 to 7   


 


Oxycodone HCl  10 mg 07/05/17 10:17 07/06/17 16:39





  Roxicodone  PO   10 mg





  Q3HR PRN   Administration





  Pain 8 to 10   


 


Pantoprazole Sodium  40 mg 07/04/17 07:00 07/06/17 05:46





  Protonix  PO   40 mg





  QDAC LANDY   Administration


 


Polyethylene Glycol  17 gm 07/04/17 09:00 07/06/17 08:53





  Miralax  PO   17 gm





  DAILY LANDY   Administration


 


Senna  8.6 - 17.2 mg 07/06/17 09:00 07/06/17 08:53





  Senokot  PO   8.6 mg





  DAILY LANDY   Administration


 


Sodium Chloride  10 ml 07/03/17 20:35 07/06/17 16:40





  Normal Saline Flush 0.9%  IVP   10 ml





  PRN PRN   Administration





  AS NEEDED PER PROVIDER ORDERS   


 


Sodium Chloride  10 ml 07/03/17 22:00 07/06/17 12:47





  Normal Saline Flush 0.9%  IVP   10 ml





  Q8HR LANDY   Administration


 


Trazodone HCl  150 mg 07/04/17 21:00 07/05/17 21:08





  Desyrel  PO   150 mg





  QPM LANDY   Administration


 


Zolpidem Tartrate  5 mg 07/03/17 20:35 07/05/17 21:10





  Ambien  PO   5 mg





  QPM PRN   Administration





  Insomnia   














- Lab Result


Fish Bone Diagrams: 


 07/05/17 05:18





 07/05/17 05:18





- Additional Planning


My Orders: 





My Active Orders





07/06/17 09:00


Docusate Sodium 250Mg Capsule [Colace 250Mg Capsule]   250 - 500 mg PO DAILY 


Senna [Senokot]   8.6 - 17.2 mg PO DAILY 














Subjective





- Subjective


Patient Reports: Feeling Better, Pain


Nursing Reports: Pain





Objective


Vital Signs: 





 Vital Signs - 24 hr











  07/05/17 07/06/17 07/06/17





  21:00 00:31 05:00


 


Temperature 36.7 C 36.9 C 36.6 C


 


Heart Rate [ 68 58 L 61





Brachial]   


 


Respiratory 18 16 16





Rate   


 


Blood Pressure 111/67 123/75 135/80 H





[Left Brachial   





artery]   


 


O2 Saturation 95 95 96














  07/06/17 07/06/17





  07:56 11:48


 


Temperature 36.5 C 36.5 C


 


Heart Rate [ 62 59 L





Brachial]  


 


Respiratory 18 16





Rate  


 


Blood Pressure 159/90 H 115/74





[Left Brachial  





artery]  


 


O2 Saturation 97 93








 Oxygen











O2 Source                      Room air














I&O (Last 24 Hrs): 





 Intake and Output Totals x24h











 07/04/17 07/05/17 07/06/17





 23:59 23:59 23:59


 


Intake Total 1170 1300 1312


 


Output Total 360 1250 775


 


Balance 810 50 537











General: Alert, Oriented x3, Cooperative, No acute distress


HEENT: PERRLA, EOMI


Neck: Supple, No JVD, No thyromegaly


Neuro: Alert, Oriented Times 3


Cardiovascular: Regular rate, No murmurs


Respiratory: Chest non-tender, Breath sounds nml


Abdomen: Normal bowel sounds, Soft, No tenderness


Skin: No rashes, No breakdown





- Results


Results: 





 Laboratory Results











WBC  9.6 x10^3/uL (4.8-10.8)   07/05/17  05:18    


 


RBC  4.35 10^6/uL (4.70-6.10)  L  07/05/17  05:18    


 


Hgb  12.8 g/dL (14.0-18.0)  L  07/05/17  05:18    


 


Hct  37.6 % (42.0-52.0)  L  07/05/17  05:18    


 


MCV  86.6 fL (80.0-94.0)   07/05/17  05:18    


 


MCH  29.3 pg (27.0-31.0)   07/05/17  05:18    


 


MCHC  33.9 g/dL (32.0-36.0)   07/05/17  05:18    


 


RDW  13.7 % (12.0-15.0)   07/05/17  05:18    


 


Plt Count  150 10^3/uL (130-450)   07/05/17  05:18    


 


MPV  8.5 fL (7.4-11.4)   07/05/17  05:18    


 


Neut #  5.8 10^3/uL (1.5-6.6)   07/05/17  05:18    


 


Lymph #  1.9 10^3/uL (1.5-3.5)   07/05/17  05:18    


 


Mono #  1.5 10^3/uL (0.0-1.0)  H  07/05/17  05:18    


 


Eos #  0.3 10^3/uL (0.0-0.7)   07/05/17  05:18    


 


Baso #  0.0 10^3/uL (0.0-0.1)   07/05/17  05:18    


 


Absolute Nucleated RBC  0.00 x10^3/uL  07/05/17  05:18    


 


Nucleated RBCs  0.0 /100WBC  07/05/17  05:18    


 


PT  11.4 secs (9.9-12.6)   07/03/17  20:20    


 


INR  1.0  (0.8-1.2)   07/03/17  20:20    


 


Sodium  140 mmol/L (135-145)   07/05/17  05:18    


 


Potassium  3.5 mmol/L (3.5-5.0)   07/05/17  05:18    


 


Chloride  108 mmol/L (101-111)   07/05/17  05:18    


 


Carbon Dioxide  27 mmol/L (21-32)   07/05/17  05:18    


 


Anion Gap  5.0  (6-13)  L  07/05/17  05:18    


 


BUN  21 mg/dL (6-20)  H  07/05/17  05:18    


 


Creatinine  1.0 mg/dL (0.6-1.2)   07/05/17  05:18    


 


Estimated GFR (MDRD)  74  (>89)  L  07/05/17  05:18    


 


Glucose  127 mg/dL ()  H  07/05/17  05:18    


 


Calcium  8.3 mg/dL (8.5-10.3)  L  07/05/17  05:18    


 


Total Bilirubin  0.6 mg/dL (0.2-1.0)   07/05/17  05:18    


 


AST  26 IU/L (10-42)   07/05/17  05:18    


 


ALT  25 IU/L (10-60)   07/05/17  05:18    


 


Alkaline Phosphatase  109 IU/L ()   07/05/17  05:18    


 


Total Protein  6.0 g/dL (6.7-8.2)  L  07/05/17  05:18    


 


Albumin  3.3 g/dL (3.2-5.5)   07/05/17  05:18    


 


Globulin  2.7 g/dL (2.1-4.2)   07/05/17  05:18    


 


Albumin/Globulin Ratio  1.2  (1.0-2.2)   07/05/17  05:18    


 


Lipase  26 U/L (22-51)   07/03/17  20:20

## 2017-07-07 NOTE — DISCHARGE PLAN
Discharge Plan


Disposition: 01 Home, Self Care


Condition: Good


Prescriptions: 


oxyCODONE [Roxicodone] 10 mg PO Q4HR PRN #40 tablet


 PRN Reason: Pain 8 to 10


diazePAM [Valium] 5 mg PO Q6H PRN #35 tablet


 PRN Reason: Muscle spasms


Diet: Regular


Activity Restrictions: Activity as Tolerated


Shower Restrictions: No


Driving Restrictions: Yes (clearance by PCP)


Weight Bearing: Full Weight


Additional Instructions or Follow Up instructions: 


Stretch both legs for at least 30 seconds twice a day.


Take meds or what ever is needed to avoid constipation.


Folllow up with your PCP in the next week.


Thank you,   Dr. Shamar Low Smoking: If you smoke, Please STOP!  Call [phone] for help.


Follow-up with: 


Saranya Keane ARNP, FNP-BC [Physician No Access] - 1 Week

## 2017-07-08 NOTE — DISCHARGE SUMMARY
DATE OF ADMISSION:  07/04/2017

 

DATE OF DISCHARGE:  07/07/2017

 

PCP: MANAN Cochran

 

ADMISSION DIAGNOSES

1. Acute T2 and T3 compression fractures secondary to trauma.

2. Multiple rib fractures, acute left posterior and 1st and acute left posterior medial 2nd and 3rd r
ib fractures.

3. Occipital scalp laceration.

4. Hemothorax on the left.

5. Pulmonary nodule.

6. Hyperlipidemia.

7. Posttraumatic stress disorder. 

 

DISCHARGE DIAGNOSES

1. Compression fractures T2-3 with severe back pain, improved.

2. Multiple rib fractures.

3. Occipital scalp lacerations, status post closure.

4. Pneumothorax on the left, resolved.

5. Pulmonary nodule, left, 4 mm. Recommend outpatient follow up CT.

6. Hyperlipidemia, on statin.

7. Posttraumatic stress disorder without exacerbation. 

 

SPECIAL PROCEDURES: Patient had CT of the head, CT of the neck. 

 

CT of the head impression

1. No definite infarcts, intracranial hemorrhage, mass, or hydrocephalus.

2. Mild white matter changes. Age indeterminate, but appear chronic. Finding may represent sequelae o
f chronic small vessel ischemic disease. If there is concern of acute stroke or symptoms persist, MRI
 brain without contrast can be considered to evaluate smaller cell infarct. 

 

CT of the neck impression

1. No evidence for acute fracture in cervical spine.

2. Mild to moderate degenerative changes.

3. See abnormal findings on CT thoracic spine report. 

 

CT of the thoracic spine impression

1. Acute T2, T3 compression fractures involving superior endplate, mild compression. Acute left poste
rior medial 1st rib fracture, mildly displaced. Acute left posterior medial 2nd and 3rd rib fractures
 extending into the costovertebral joints. Small left apex hemothorax associated with a 1st rib fract
ure.

2. A couple of tiny right upper lobe pulmonary nodules measuring 4 mm. Recommend 1 year follow up on 
chest CT to confirm stability. 

 

CONSULTATIONS: Yudith Trejo MD. See copy of consultation. 

 

HOSPITAL COURSE AND MANAGEMENT: The initial presentation in the hospital emergency department evaluat
ion and hospitalist plan well described in history and physical. See copy of same. 

 

SUMMARY: The patient is a 71-year-old gentleman, past medical history significant for hyperlipidemia 
and PTSD, who took a fall while playing tennis and resulting bleeding scalp laceration, neck pain. Th
e patient overran the ball and went headfirst into the [employer] fence. He had felt his left hamstri
ng give out. The patient was found to have 3 rib fractures, 2 thoracic spine compression fractures, a
nd small left hemothorax and was therefore admitted to the hospital. Patient had continued severe milady
n the next 24 hours. Slowly over the next couple of days, was able to be coaxed out of bed and [facility]
ed pain control and received services in the physical therapy department. 

 

PHYSICAL EXAMINATION

VITAL SIGNS: On the day of discharge, temperature 36.4, 62, 125/62, 60, 96% room air sat.

HEENT:  The patient's eyes, EOM within normal limits, PERRL. nonicteric. Mouth and throat: moist muco
us membranes, no other pathology, mouth, pharynx.

NECK:  Has some stiffness in the back of the neck. No thyromegaly. No lymphadenopathy. No JVD.

CHEST WALL: Tender left side upper chest, symmetric.

HEART: Normal sinus rhythm. No murmur.

LUNGS: Clear to auscultation.

ABDOMEN: Soft, nontender. Normal bowel sounds. No hepatosplenomegaly.

RECTAL/GENITALIA: Not done.

EXTREMITIES: Patient has point tenderness at the origin of the biceps femoralis muscles on the left l
ower extremity.

NEURO: Cognitive intact. Cranial nerves intact. Motor intact.

VASCULAR: Has normal posterior tibial pulses bilaterally. Normal capillary refill. 

 

LABORATORY: The patient had a white count of 9.6, 12 and 37 hemoglobin and hematocrit, and sodium was
 140, potassium 3.5, chloride is 108, CO2 of 27, BUN 21, creatinine 1.0. The patient's glucose is 127
. Calcium 8.3. Albumin is 3.3. Normal liver enzymes. 

 

ALLERGIES: NO KNOWN ALLERGIES.

 

DISPOSITION: He was discharged home. 

 

DISCHARGE MEDICATIONS

1. Trazodone 150 mg q. p.m.

2. Simvastatin 10 mg a day.

3. Zantac 150 mg daily.

4. Oxycodone 10 mg every 3-4 hours if needed for severe pain.

5. Valium 5 mg q.6 hours p.r.n.

6. Senokot 1-2 tabs daily.

7. Magnesium oxide 200 mg b.i.d.

8. Lidoderm patch q.12 hours p.r.n. pain, back or legs. 

 

The patient is to follow up with Saranya Keane in the next week and suggested to get continued phy
sical therapy. 

 

 

 

DD:07/08/2017 06:36:00  DT: 07/08/2017 07:42  JOB #: [id number]  EXT JOB #:[id number]

## 2018-03-12 NOTE — ULTRASOUND REPORT
AORTA [id number]

 

CLINICAL INDICATION:  Smoking history.

 

TECHNIQUE:  Real-time sonographic images were performed by the sonographer through the aorta.  

Multiple representative static images were saved for review.

 

FINDINGS:  The abdominal aorta is normal in caliber, measuring 2.8 cm proximally, 2.1 cm in the

mid portion, and 2.3 cm distally.  The iliacs are normal in caliber.  No free fluid is present.

 

IMPRESSION:  NO EVIDENCE OF ABDOMINAL AORTIC ANEURYSM.

 

 

 

 

 

DD: 2018 11:23

TD: 2018 13:08

Job #: [id number]

## 2018-11-19 ENCOUNTER — HOSPITAL ENCOUNTER (OUTPATIENT)
Dept: HOSPITAL 76 - SC | Age: 73
Discharge: HOME | End: 2018-11-19
Attending: INTERNAL MEDICINE
Payer: OTHER GOVERNMENT

## 2018-11-19 DIAGNOSIS — R06.89: ICD-10-CM

## 2018-11-19 DIAGNOSIS — R09.89: ICD-10-CM

## 2018-11-19 DIAGNOSIS — G47.8: ICD-10-CM

## 2018-11-19 DIAGNOSIS — G47.10: Primary | ICD-10-CM

## 2018-11-19 DIAGNOSIS — R06.83: ICD-10-CM

## 2018-11-19 PROCEDURE — 99212 OFFICE O/P EST SF 10 MIN: CPT

## 2018-11-19 PROCEDURE — 99203 OFFICE O/P NEW LOW 30 MIN: CPT

## 2019-01-04 ENCOUNTER — HOSPITAL ENCOUNTER (OUTPATIENT)
Dept: HOSPITAL 76 - SC | Age: 74
Discharge: HOME | End: 2019-01-04
Attending: INTERNAL MEDICINE
Payer: OTHER GOVERNMENT

## 2019-01-04 DIAGNOSIS — G47.61: ICD-10-CM

## 2019-01-04 DIAGNOSIS — G47.33: Primary | ICD-10-CM

## 2019-01-04 PROCEDURE — 95810 POLYSOM 6/> YRS 4/> PARAM: CPT

## 2019-02-11 ENCOUNTER — HOSPITAL ENCOUNTER (OUTPATIENT)
Dept: HOSPITAL 76 - SC | Age: 74
Discharge: HOME | End: 2019-02-11
Attending: NURSE PRACTITIONER
Payer: OTHER GOVERNMENT

## 2019-02-11 DIAGNOSIS — G47.61: ICD-10-CM

## 2019-02-11 DIAGNOSIS — G47.33: Primary | ICD-10-CM

## 2019-02-11 PROCEDURE — 99212 OFFICE O/P EST SF 10 MIN: CPT

## 2019-02-11 PROCEDURE — 99215 OFFICE O/P EST HI 40 MIN: CPT

## 2022-01-31 ENCOUNTER — HOSPITAL ENCOUNTER (OUTPATIENT)
Dept: HOSPITAL 76 - DI | Age: 77
Discharge: HOME | End: 2022-01-31
Attending: NURSE PRACTITIONER
Payer: MEDICARE

## 2022-01-31 DIAGNOSIS — R91.8: Primary | ICD-10-CM

## 2022-01-31 DIAGNOSIS — M48.54XA: ICD-10-CM

## 2022-01-31 DIAGNOSIS — I25.10: ICD-10-CM

## 2022-01-31 NOTE — CT REPORT
PROCEDURE:  CHEST WO

 

INDICATIONS:  PULMONARY NODULE

 

TECHNIQUE: 

Noncontrast 1mm axial images were acquired from the pulmonary apices to the posterior costophrenic an
gles.  Axial 5 mm soft tissue kernel reconstructions were performed as well as 8 mm axial MIP and cor
onal and sagittal 5 mm reformations. For radiation dose reduction, the following was used: automate
d exposure control, adjustment of mA and/or kV according to patient size.

 

COMPARISON:  Chest x-ray 7/4/2017 no prior CT scans are available for comparison. An addendum can be 
done if and when these are located.

 

FINDINGS:  

Image quality:  Excellent.  

 

Lungs and pleura:  Right apical lung nodule measures 4 mm, 4/40. 3 mm right upper lobe lung nodule, 4
/54, and 4 mm nodule, 4/60 are present. Flat nodular juxta fissural opacity is present, 4/144. Part s
olid lateral left lower lobe nodule measures 5 mm, 4/187. Juxta fissural nodule associated with the l
eft major fissure at a midlung level measures about 5 mm, 4/138. A 4 mm lateral left upper lobe nodul
e at the level of the anjelica, 4/108 is present. No other lung nodules.

 

No acute air space opacities.  No pleural effusions or pneumothorax.  Central and peripheral airways 
are patent and normal in caliber.  

 

Mediastinum:  Heart size is normal.  No pericardial effusion.  Mild coronary artery calcification. No
 mediastinal adenopathy by size criteria.  Thoracic aorta and central pulmonary arteries are normal i
n size.  Esophagus is normal in caliber.  No hiatal hernia.  

 

Bones and chest wall:  No suspicious bony lesions. There is a central endplate depression with modera
te height loss involving the T3 vertebral body. Degenerative endplate changes elsewhere throughout th
e thoracic spine. No axillary or supraclavicular adenopathy by size criteria.  The thyroid is normal 
in size and there are no incidental findings.

 

Abdomen:  The gallbladder is decompressed. Unenhanced upper abdominal organs are normal. There is a p
artially imaged anastomotic staple line in the right upper quadrant. Mild abdominal aortic calcificat
ion. 

 

IMPRESSION:  

1. Several lung nodules bilaterally measuring 5 mm and less. Without prior CT imaging available, surv
eillance is recommended. An addendum can be generated if prior study becomes available and these nodu
les, if stable, could be declared benign.

2. T3 vertebral body fracture of uncertain chronicity.

3. Mild coronary artery disease.

 

Reviewed by: Anna Caraballo MD on 1/31/2022 10:17 AM PST

Approved by: Anna Caraballo MD on 1/31/2022 10:17 AM [facility]

 

 

Station ID:  [id number]